# Patient Record
Sex: FEMALE | Race: BLACK OR AFRICAN AMERICAN | URBAN - METROPOLITAN AREA
[De-identification: names, ages, dates, MRNs, and addresses within clinical notes are randomized per-mention and may not be internally consistent; named-entity substitution may affect disease eponyms.]

---

## 2020-03-16 ENCOUNTER — HOSPITAL ENCOUNTER (OUTPATIENT)
Dept: PHYSICAL THERAPY | Age: 25
Discharge: HOME | End: 2020-03-16
Payer: COMMERCIAL

## 2020-03-16 PROCEDURE — 97535 SELF CARE MNGMENT TRAINING: CPT

## 2020-03-16 PROCEDURE — 97161 PT EVAL LOW COMPLEX 20 MIN: CPT

## 2020-03-16 PROCEDURE — 97110 THERAPEUTIC EXERCISES: CPT

## 2020-03-16 NOTE — PROGRESS NOTES
PT DAILY TREATMENT NOTE  10-18    Patient Name: Bib Liu  Date:3/16/2020  : 1995  [x]  Patient  Verified  Payor: Cheri Rounds / Plan: [facility] CHOICE PLUS / Product Type: PPO /    In time:3:13  Out time:4:00  Total Treatment Time (min): 52  Visit #: 1 of 4    Treatment Area: Displaced fracture of head of left radius, subsequent encounter for closed fracture with routine healing [X29.357T]    SUBJECTIVE  Pain Level (0-10 scale): 3  Any medication changes, allergies to medications, adverse drug reactions, diagnosis change, or new procedure performed?: [x] No    [] Yes (see summary sheet for update)  Subjective functional status/changes:   [] No changes reported  See POC    OBJECTIVE    23 min [x]Eval                  []Re-Eval     12 min Therapeutic Exercise:  [] See flow sheet : HEP instruction and demonstration   Rationale: increase ROM and increase strength to improve the patients ability to tolerate ADLs    12 min Self Care/Home Management: []  See flow sheet : pt education regarding anatomy and physiology of the UEs and how it relates to the pt's condition, pt education on using ice for 15-20 mins as needed to decrease pain/symptoms, pt education on avoiding activities that increase her pain/symptoms, pt education on talking to her MD if she is worried about her work duties because of her left elbow restrictions when she returns to work    Rationale: increase ROM, increase strength and decrease pain/symptoms  to improve the patients ability to tolerate functional tasks. With   [x] TE   [] TA   [] neuro   [x] Other: Self Care/Home management Patient Education: [x] Review HEP    [] Progressed/Changed HEP based on:   [] positioning   [] body mechanics   [] transfers   [] heat/ice application    [] other:      Other Objective/Functional Measures: See evaluation. Pain Level (0-10 scale) post treatment: 0    ASSESSMENT/Changes in Function: Pt given HEP handout to perform.  Pt understood exercises in HEP handout. Pt demonstrated decreased AROM, decreased strength, muscle tightness, and impaired mobility. Left elbow EXT lacking 37 degs AROM, lacking 18 degs PROM. Decreased  strength is also noted on the left hand when compared to the right. Pt would benefit from physical therapy to improve the above impairments to help the pt return to performing ADLs, functional and work activities. Patient will continue to benefit from skilled PT services to modify and progress therapeutic interventions, address functional mobility deficits, address ROM deficits, address strength deficits, analyze and address soft tissue restrictions, analyze and cue movement patterns, analyze and modify body mechanics/ergonomics, assess and modify postural abnormalities and instruct in home and community integration to attain remaining goals. [x]  See Plan of Care  []  See progress note/recertification  []  See Discharge Summary         Progress towards goals / Updated goals:  Short Term Goals: To be accomplished in 1 weeks:  1. Pt will report compliance and independence to [facility] to help the pt manage their pain and symptoms. Eval: established   Long Term Goals: To be accomplished in 4 weeks:  1. Pt will increase FOTO score to 75 points to improve ability to perform ADLs. Eval: 48 points  2. Pt will increase improve left  strength to an average of 35 lbs (out of 3 trials) to improve ability to tolerate gripping items in the left UE. Eval: 27 lbs (average of 3 trials)  3. Pt will increase AROM left elbow EXT to 0 degs, flex to WNL to improve ease of work tasks with when she returns to work. Eval: AROM EXT lacking 37 degs, flex 136 degs  4. Pt will report being able to lift a gallon of milk with the left UE with minimal to no increased pain/difficulty to improve ease of household tasks.    Eval: reports she is unable to lift a gallon of milk with the left UE    PLAN  [x]  Upgrade activities as tolerated     [x] Continue plan of care  [x]  Update interventions per flow sheet       []  Discharge due to:_  []  Other:_      Lashay West, PT 3/16/2020  4:15 PM    No future appointments.

## 2020-03-16 NOTE — PROGRESS NOTES
[facility] [address]  [place], [address]  [phone] fax    Plan of Care/ Statement of Necessity for Physical Therapy Services  Patient name: Silverio Sullivan Start of Care: 3/16/2020   Referral source: Efra Wakefield MD : 1995    Medical Diagnosis: Displaced fracture of head of left radius, subsequent encounter for closed fracture with routine healing [S52.122D]  Payor: Hayes De La Torre / Plan: [employer] / Product Type: PPO /  Onset Date: 2020    Treatment Diagnosis: left elbow pain   Prior Hospitalization: see medical history Provider#: [id number]   Medications: Verified on Patient summary List    Comorbidities: none   Prior Level of Function: Independent with ADLs, functional, and work activities with no limitations. The Plan of Care and following information is based on the information from the initial evaluation. Assessment/ key information:   Pt is a 22year old female who presents to therapy today with left elbow pain s/p radial head fracture. Pt states that she fell on 2020 when she landed on her hand and left elbow. Pt reports going to urgent care and had the elbow in a cast and sling. Pt states that the MD took the sling/cast off and has a work restriction of lifting no more than 10 lbs (states she returns to work next week). Pt reports having difficulty with extending her left elbow and picking up items. Pt demonstrated decreased AROM, decreased strength, muscle tightness, and impaired mobility. Left elbow EXT lacking 37 degs AROM, lacking 18 degs PROM. Decreased  strength is also noted on the left hand when compared to the right. Limited left wrist supination AROM/PROM noted as well. Pt would benefit from physical therapy to improve the above impairments to help the pt return to performing ADLs, functional and work activities.      Evaluation Complexity History LOW Complexity : Zero comorbidities / personal factors that will impact the outcome / POC; Examination MEDIUM Complexity : 3 Standardized tests and measures addressing body structure, function, activity limitation and / or participation in recreation  ;Presentation LOW Complexity : Stable, uncomplicated  ;Clinical Decision Making MEDIUM Complexity : FOTO score of 26-74  Overall Complexity Rating: LOW   Problem List: pain affecting function, decrease ROM, decrease strength, edema affecting function, decrease ADL/ functional abilitiies, decrease activity tolerance, decrease flexibility/ joint mobility and decrease transfer abilities   Treatment Plan may include any combination of the following: Therapeutic exercise, Therapeutic activities, Neuromuscular re-education, Physical agent/modality, Manual therapy, Patient education, Self Care training, Functional mobility training and Home safety training  Patient / Family readiness to learn indicated by: asking questions, trying to perform skills and interest  Persons(s) to be included in education: patient (P)  Barriers to Learning/Limitations: None  Patient Goal (s): stretching the arm fully out and no stiffness  Patient Self Reported Health Status: good  Rehabilitation Potential: good    Short Term Goals: To be accomplished in 1 weeks:  1. Pt will report compliance and independence to [facility] to help the pt manage their pain and symptoms. Eval: established   Long Term Goals: To be accomplished in 4 weeks:  1. Pt will increase FOTO score to 75 points to improve ability to perform ADLs. Eval: 48 points  2. Pt will increase improve left  strength to an average of 35 lbs (out of 3 trials) to improve ability to tolerate gripping items in the left UE. Eval: 27 lbs (average of 3 trials)  3. Pt will increase AROM left elbow EXT to 0 degs, flex to WNL to improve ease of work tasks with when she returns to work. Eval: AROM EXT lacking 37 degs, flex 136 degs  4.  Pt will report being able to lift a gallon of milk with the left UE with minimal to no increased pain/difficulty to improve ease of household tasks. Eval: reports she is unable to lift a gallon of milk with the left UE    Frequency / Duration: Patient to be seen 1 times per week for 4 weeks. Patient/ Caregiver education and instruction: Diagnosis, prognosis, self care, activity modification and exercises   [x]  Plan of care has been reviewed with MONIQUE Ndiaye, PT 3/16/2020 4:10 PM  _____________________________________________________________________  I certify that the above Therapy Services are being furnished while the patient is under my care. I agree with the treatment plan and certify that this therapy is necessary.     Physician's Signature:____________________  Date:__________Time:______    Please sign and return to [facility] [address]  [address]  [place], [address]  [phone] fax

## 2020-03-23 ENCOUNTER — HOSPITAL ENCOUNTER (OUTPATIENT)
Dept: PHYSICAL THERAPY | Age: 25
Discharge: HOME | End: 2020-03-23
Payer: COMMERCIAL

## 2020-03-23 PROCEDURE — 97140 MANUAL THERAPY 1/> REGIONS: CPT

## 2020-03-23 PROCEDURE — 97110 THERAPEUTIC EXERCISES: CPT

## 2020-03-23 NOTE — PROGRESS NOTES
PT DAILY TREATMENT NOTE  10-18    Patient Name: Julio C Aldana  Date:3/23/2020  : 1995  [x]  Patient  Verified  Payor: Jamil Sung / [facility] [employer] / Product Type: PPO /    In time: 12:01   Out time: 1:01  Total Treatment Time (min): 60  Visit #: 2 of 4    Treatment Area: Displaced fracture of head of left radius, subsequent encounter for closed fracture with routine healing [V90.977Z]    SUBJECTIVE  Pain Level (0-10 scale): 0  Any medication changes, allergies to medications, adverse drug reactions, diagnosis change, or new procedure performed?: [x] No    [] Yes (see summary sheet for update)  Subjective functional status/changes:   [] No changes reported  Pt reports compliance with HEP and states she feels her left elbow is straighter. Pt reports she is returning to work Wednesday.      OBJECTIVE    Modality rationale: decrease inflammation and decrease pain to improve the patients ability to tolerate ADLs   Min Type Additional Details    [] Estim:  []Unatt       []IFC  []Premod                        []Other:  []w/ice   []w/heat  Position:  Location:    [] Estim: []Att    []TENS instruct  []NMES                    []Other:  []w/US   []w/ice   []w/heat  Position:  Location:    []  Traction: [] Cervical       []Lumbar                       [] Prone          []Supine                       []Intermittent   []Continuous Lbs:  [] before manual  [] after manual    []  Ultrasound: []Continuous   [] Pulsed                           []1MHz   []3MHz Location:  W/cm2:    []  Iontophoresis with dexamethasone         Location: [] Take home patch   [] In clinic   10 [x]  Ice     []  heat  []  Ice massage  []  Laser   []  Anodyne Position: supine with elbow EXT  Location: left elbow/forearm    []  Laser with stim  []  Other: Position:  Location:    []  Vasopneumatic Device Pressure:       [] lo [] med [] hi   Temperature: [] lo [] med [] hi   [] Skin assessment post-treatment:  []intact []redness- no adverse reaction    []redness  adverse reaction:     42 min Therapeutic Exercise:  [x] See flow sheet : exercises, HEP instruction (see below), ROM measurements   Rationale: increase ROM and increase strength to improve the patients ability to tolerate ADLs    8 min Manual Therapy: in sitting: DTM to left wrist extensors   Rationale: decrease pain, increase ROM and increase tissue extensibility to improve activity tolerance. With   [x] TE   [] TA   [] neuro   [] Other:  Patient Education: [x] Review HEP    [x] Progressed/Changed HEP based on:   [] positioning   [x] body mechanics   [] transfers   [] heat/ice application    [] other:      Other Objective/Functional Measures: Initiated exercises/interventions in flow sheet. Left elbow EXT AROM: Lacking 7 degs. Continues to demonstrate limitations with left wrist supination AROM. Pain Level (0-10 scale) post treatment: 0    ASSESSMENT/Changes in Function: Reported no pain post session today. Pt demonstrates significant improvements in left elbow AROM since the evaluation last week. She continues to demonstrate limitations with left wrist supination AROM and decreased overall  strength. Educated pt on continuing HEP at home and adding exercises she performed in the clinic today to her HEP to further improve ROM and strength. Continue POC as tolerated. Patient will continue to benefit from skilled PT services to modify and progress therapeutic interventions, address functional mobility deficits, address ROM deficits, address strength deficits, analyze and address soft tissue restrictions, analyze and cue movement patterns, analyze and modify body mechanics/ergonomics, assess and modify postural abnormalities and instruct in home and community integration to attain remaining goals.      []  See Plan of Care  []  See progress note/recertification  []  See Discharge Summary         Progress towards goals / Updated goals:  Short Term Goals: To be accomplished in 1 weeks:  1. Pt will report compliance and independence to HEP to help the pt manage their pain and symptoms. Eval: established   Current: MET, reports compliance 3/23/2020  Long Term Goals: To be accomplished in 4 weeks:  1. Pt will increase FOTO score to 75 points to improve ability to perform ADLs. Eval: 48 points  2. Pt will increase improve left  strength to an average of 35 lbs (out of 3 trials) to improve ability to tolerate gripping items in the left UE. Eval: 27 lbs (average of 3 trials)  3. Pt will increase AROM left elbow EXT to 0 degs, flex to WNL to improve ease of work tasks with when she returns to work. Eval: AROM EXT lacking 37 degs, flex 136 degs  4. Pt will report being able to lift a gallon of milk with the left UE with minimal to no increased pain/difficulty to improve ease of household tasks.    Eval: reports she is unable to lift a gallon of milk with the left UE    PLAN  [x]  Upgrade activities as tolerated     [x]  Continue plan of care  [x]  Update interventions per flow sheet       []  Discharge due to:_  []  Other:_      Estefani Mack, PT 3/23/2020  12:08 PM    Future Appointments   Date Time Provider Shelly Frederick   3/30/2020  2:00 PM Agusto Berman, PT [facility]   4/6/2020 12:00 PM John Escamilla, PT [facility]

## 2020-03-30 ENCOUNTER — APPOINTMENT (OUTPATIENT)
Dept: PHYSICAL THERAPY | Age: 25
End: 2020-03-30
Payer: COMMERCIAL

## 2020-04-06 ENCOUNTER — APPOINTMENT (OUTPATIENT)
Dept: PHYSICAL THERAPY | Age: 25
End: 2020-04-06

## 2020-04-10 NOTE — PROGRESS NOTES
[facility]  [address]  [place], [address]  [phone] fax    Physical Therapy Discharge Summary  Patient name: Francis Tubbs Start of Care: 3/16/2020   Referral source: Clover Ramsay MD : 1995               Medical Diagnosis: Displaced fracture of head of left radius, subsequent encounter for closed fracture with routine healing [S52.122D]  Payor: Judi Moreno / Plan: [employer] / Product Type: PPO /  Onset Date: 2020               Treatment Diagnosis: left elbow pain   Prior Hospitalization: see medical history Provider#: [id number]   Medications: Verified on Patient summary List    Comorbidities: none   Prior Level of Function: Independent with ADLs, functional, and work activities with no limitations. Visits from Start of Care: 2    Missed Visits: 0  Reporting Period : 3/16/2020 to 3/23/2020    Summary of Care:  Goal: Pt will report compliance and independence to [facility] to help the pt manage their pain and symptoms. Status at last note/certification: unable to reassess   Status at discharge: not met    Goal: Pt will increase FOTO score to 75 points to improve ability to perform ADLs. Status at last note/certification: unable to reassess   Status at discharge: not met    Goal: Pt will increase improve left  strength to an average of 35 lbs (out of 3 trials) to improve ability to tolerate gripping items in the left UE. Status at last note/certification: unable to reassess   Status at discharge: not met    Goal: Pt will increase AROM left elbow EXT to 0 degs, flex to WNL to improve ease of work tasks with when she returns to work. Status at last note/certification: unable to reassess   Status at discharge: not met    Goal: Pt will report being able to lift a gallon of milk with the left UE with minimal to no increased pain/difficulty to improve ease of household tasks.    Status at last note/certification: unable to reassess   Status at discharge: not met    ASSESSMENT/RECOMMENDATIONS:  Pt was seen for 2 therapy sessions. Per covid-19 follow up telephone call, the pt agrees to d/c from therapy at this time. Pt is therefore d/c'ed and unable to reassess goals at this time.    [x]Discontinue therapy: []Patient has reached or is progressing toward set goals      [x]Patient is non-compliant or has abdicated; covid-19      []Due to lack of appreciable progress towards set goals    Jeff Mccoy, PT 4/10/2020 9:12 AM

## 2024-12-30 ENCOUNTER — HOSPITAL ENCOUNTER (OUTPATIENT)
Facility: HOSPITAL | Age: 29
Discharge: HOME | End: 2025-01-02
Payer: COMMERCIAL

## 2024-12-30 VITALS — HEIGHT: 63 IN | BODY MASS INDEX: 35.44 KG/M2 | WEIGHT: 200 LBS

## 2024-12-30 DIAGNOSIS — Z01.818 PRE-OP TESTING: Primary | ICD-10-CM

## 2024-12-30 LAB
BASOPHILS # BLD: 0 K/UL (ref 0–0.1)
BASOPHILS NFR BLD: 1 % (ref 0–2)
DIFFERENTIAL METHOD BLD: ABNORMAL
EOSINOPHIL # BLD: 0.1 K/UL (ref 0–0.4)
EOSINOPHIL NFR BLD: 2 % (ref 0–5)
ERYTHROCYTE [DISTWIDTH] IN BLOOD BY AUTOMATED COUNT: 19 % (ref 11.6–14.5)
HCT VFR BLD AUTO: 35.1 % (ref 35–45)
HGB BLD-MCNC: 10.6 G/DL (ref 12–16)
IMM GRANULOCYTES # BLD AUTO: 0 K/UL (ref 0–0.04)
IMM GRANULOCYTES NFR BLD AUTO: 0 % (ref 0–0.5)
LYMPHOCYTES # BLD: 2.6 K/UL (ref 0.9–3.6)
LYMPHOCYTES NFR BLD: 43 % (ref 21–52)
MCH RBC QN AUTO: 21.9 PG (ref 24–34)
MCHC RBC AUTO-ENTMCNC: 30.2 G/DL (ref 31–37)
MCV RBC AUTO: 72.7 FL (ref 78–100)
MONOCYTES # BLD: 0.5 K/UL (ref 0.05–1.2)
MONOCYTES NFR BLD: 8 % (ref 3–10)
NEUTS SEG # BLD: 2.8 K/UL (ref 1.8–8)
NEUTS SEG NFR BLD: 47 % (ref 40–73)
NRBC # BLD: 0 K/UL (ref 0–0.01)
NRBC BLD-RTO: 0 PER 100 WBC
PLATELET # BLD AUTO: 433 K/UL (ref 135–420)
PMV BLD AUTO: 10.6 FL (ref 9.2–11.8)
RBC # BLD AUTO: 4.83 M/UL (ref 4.2–5.3)
WBC # BLD AUTO: 6 K/UL (ref 4.6–13.2)

## 2024-12-30 PROCEDURE — 85025 COMPLETE CBC W/AUTO DIFF WBC: CPT

## 2025-01-06 RX ORDER — FERROUS SULFATE 325(65) MG
325 TABLET ORAL
COMMUNITY

## 2025-01-06 NOTE — PERIOP NOTE
PCP is not aware of the surgery. Instructed to bring Insurance card and ID on the day of surgery.  Advance directives status: none       SURGICAL PRE-ADMISSION INSTRUCTIONS    Your Medication instructions:    Take or Use these medications the morning of surgery with a sip of water: none    Do Not Take morning of surgery: iron    Follow surgeon's instructions for stopping NSAIDs (i.e. Ibuprofen/Motrin, Advil, BC powder, Aleve/Naproxen, Voltaren cream or tablet, Mobic/Meloxicam, Excedrin, Celebrex, Ketorolac/Toradol, etc). If no instructions provided stop NSAIDs 7 days prior to surgery.    Stop supplements and vitamins (except multivitamins-stop the night prior to surgery) 2 weeks prior to surgery.    Patient denies being on any inhalers, eye drops, ointment/lotions/gels, patches, injectable medications, phentermine, supplements, vitamins, and herbals at this time. Instructed patient to please notify Preanesthesia Testing at [phone] with any medication changes after the PAT phone appointment.    General Day Surgery Instructions    Do NOT eat or drink anything, including water, ice chips, soda, candy, or gum after MIDNIGHT on the night before surgery, unless you have specific instructions from your Surgeon or Anesthesia Provider to do so.   Please follow instructions for cleaning skin with CHG wash kit 3 days prior to surgery as directed.    Please shower with antibacterial bar soap prior to arrival on day of surgery.  No smoking/vaping/chewing tobacco products 24 hours before surgery.  No recreational drugs for one week prior to the day of surgery.  Remove all jewelry, nail polish, makeup (including mascara); no lotions, powders, deodorant, or perfume/cologne/after shave.  Leave all valuables, including money/purse/wallet, jewelry, laptop, etc.. at home.  Bring durable medical equipment (DME) needed: none  Glasses/Contact lenses, Hearing aids, and Dentures may be worn to the hospital.  They will be removed prior

## 2025-01-10 ENCOUNTER — ANESTHESIA EVENT (OUTPATIENT)
Facility: HOSPITAL | Age: 30
End: 2025-01-10
Payer: COMMERCIAL

## 2025-01-10 ENCOUNTER — HOSPITAL ENCOUNTER (OUTPATIENT)
Facility: HOSPITAL | Age: 30
Setting detail: OUTPATIENT SURGERY
Discharge: HOME | End: 2025-01-10
Attending: SURGERY | Admitting: SURGERY
Payer: COMMERCIAL

## 2025-01-10 ENCOUNTER — ANESTHESIA (OUTPATIENT)
Facility: HOSPITAL | Age: 30
End: 2025-01-10
Payer: COMMERCIAL

## 2025-01-10 ENCOUNTER — APPOINTMENT (OUTPATIENT)
Facility: HOSPITAL | Age: 30
End: 2025-01-10
Attending: SURGERY
Payer: COMMERCIAL

## 2025-01-10 VITALS
RESPIRATION RATE: 15 BRPM | SYSTOLIC BLOOD PRESSURE: 113 MMHG | BODY MASS INDEX: 35.68 KG/M2 | HEART RATE: 75 BPM | TEMPERATURE: 97.9 F | WEIGHT: 201.4 LBS | HEIGHT: 63 IN | OXYGEN SATURATION: 100 % | DIASTOLIC BLOOD PRESSURE: 67 MMHG

## 2025-01-10 DIAGNOSIS — Z30.46 NEXPLANON REMOVAL: Primary | ICD-10-CM

## 2025-01-10 LAB — HCG UR QL: NEGATIVE

## 2025-01-10 PROCEDURE — 3700000001 HC ADD 15 MINUTES (ANESTHESIA): Performed by: SURGERY

## 2025-01-10 PROCEDURE — 7100000010 HC PHASE II RECOVERY - FIRST 15 MIN: Performed by: SURGERY

## 2025-01-10 PROCEDURE — 7100000011 HC PHASE II RECOVERY - ADDTL 15 MIN: Performed by: SURGERY

## 2025-01-10 PROCEDURE — 2500000003 HC RX 250 WO HCPCS: Performed by: SURGERY

## 2025-01-10 PROCEDURE — 3700000000 HC ANESTHESIA ATTENDED CARE: Performed by: SURGERY

## 2025-01-10 PROCEDURE — 6360000002 HC RX W HCPCS: Performed by: NURSE ANESTHETIST, CERTIFIED REGISTERED

## 2025-01-10 PROCEDURE — 2580000003 HC RX 258: Performed by: SURGERY

## 2025-01-10 PROCEDURE — 81025 URINE PREGNANCY TEST: CPT

## 2025-01-10 PROCEDURE — 6360000002 HC RX W HCPCS: Performed by: SURGERY

## 2025-01-10 PROCEDURE — 2709999900 HC NON-CHARGEABLE SUPPLY: Performed by: SURGERY

## 2025-01-10 PROCEDURE — 3600000002 HC SURGERY LEVEL 2 BASE: Performed by: SURGERY

## 2025-01-10 PROCEDURE — 3600000012 HC SURGERY LEVEL 2 ADDTL 15MIN: Performed by: SURGERY

## 2025-01-10 RX ORDER — LIDOCAINE HYDROCHLORIDE 20 MG/ML
INJECTION, SOLUTION EPIDURAL; INFILTRATION; INTRACAUDAL; PERINEURAL
Status: DISCONTINUED | OUTPATIENT
Start: 2025-01-10 | End: 2025-01-10 | Stop reason: SDUPTHER

## 2025-01-10 RX ORDER — MIDAZOLAM HYDROCHLORIDE 1 MG/ML
INJECTION, SOLUTION INTRAMUSCULAR; INTRAVENOUS
Status: DISCONTINUED | OUTPATIENT
Start: 2025-01-10 | End: 2025-01-10 | Stop reason: SDUPTHER

## 2025-01-10 RX ORDER — ONDANSETRON 2 MG/ML
4 INJECTION INTRAMUSCULAR; INTRAVENOUS
Status: CANCELLED | OUTPATIENT
Start: 2025-01-10 | End: 2025-01-11

## 2025-01-10 RX ORDER — PROPOFOL 10 MG/ML
INJECTION, EMULSION INTRAVENOUS
Status: DISCONTINUED | OUTPATIENT
Start: 2025-01-10 | End: 2025-01-10 | Stop reason: SDUPTHER

## 2025-01-10 RX ORDER — SODIUM CHLORIDE, SODIUM LACTATE, POTASSIUM CHLORIDE, CALCIUM CHLORIDE 600; 310; 30; 20 MG/100ML; MG/100ML; MG/100ML; MG/100ML
INJECTION, SOLUTION INTRAVENOUS CONTINUOUS
Status: CANCELLED | OUTPATIENT
Start: 2025-01-10

## 2025-01-10 RX ORDER — ACETAMINOPHEN 325 MG/1
650 TABLET ORAL
Status: CANCELLED | OUTPATIENT
Start: 2025-01-10 | End: 2025-01-11

## 2025-01-10 RX ORDER — SODIUM CHLORIDE 0.9 % (FLUSH) 0.9 %
5-40 SYRINGE (ML) INJECTION EVERY 12 HOURS SCHEDULED
Status: CANCELLED | OUTPATIENT
Start: 2025-01-10

## 2025-01-10 RX ORDER — LABETALOL HYDROCHLORIDE 5 MG/ML
5 INJECTION, SOLUTION INTRAVENOUS
Status: CANCELLED | OUTPATIENT
Start: 2025-01-10

## 2025-01-10 RX ORDER — HYDROMORPHONE HYDROCHLORIDE 1 MG/ML
0.5 INJECTION, SOLUTION INTRAMUSCULAR; INTRAVENOUS; SUBCUTANEOUS EVERY 5 MIN PRN
Status: CANCELLED | OUTPATIENT
Start: 2025-01-10

## 2025-01-10 RX ORDER — NALOXONE HYDROCHLORIDE 0.4 MG/ML
INJECTION, SOLUTION INTRAMUSCULAR; INTRAVENOUS; SUBCUTANEOUS PRN
Status: CANCELLED | OUTPATIENT
Start: 2025-01-10

## 2025-01-10 RX ORDER — OXYCODONE AND ACETAMINOPHEN 5; 325 MG/1; MG/1
1 TABLET ORAL EVERY 4 HOURS PRN
Qty: 4 TABLET | Refills: 0 | Status: SHIPPED | OUTPATIENT
Start: 2025-01-10 | End: 2025-01-11

## 2025-01-10 RX ORDER — DIPHENHYDRAMINE HYDROCHLORIDE 50 MG/ML
12.5 INJECTION INTRAMUSCULAR; INTRAVENOUS
Status: CANCELLED | OUTPATIENT
Start: 2025-01-10 | End: 2025-01-11

## 2025-01-10 RX ORDER — SODIUM CHLORIDE 0.9 % (FLUSH) 0.9 %
5-40 SYRINGE (ML) INJECTION PRN
Status: CANCELLED | OUTPATIENT
Start: 2025-01-10

## 2025-01-10 RX ORDER — MIDAZOLAM HYDROCHLORIDE 2 MG/2ML
2 INJECTION, SOLUTION INTRAMUSCULAR; INTRAVENOUS
Status: CANCELLED | OUTPATIENT
Start: 2025-01-10 | End: 2025-01-11

## 2025-01-10 RX ORDER — FENTANYL CITRATE 50 UG/ML
25 INJECTION, SOLUTION INTRAMUSCULAR; INTRAVENOUS EVERY 5 MIN PRN
Status: CANCELLED | OUTPATIENT
Start: 2025-01-10

## 2025-01-10 RX ORDER — SODIUM CHLORIDE 9 MG/ML
INJECTION, SOLUTION INTRAVENOUS PRN
Status: CANCELLED | OUTPATIENT
Start: 2025-01-10

## 2025-01-10 RX ORDER — SODIUM CHLORIDE 9 MG/ML
INJECTION, SOLUTION INTRAVENOUS CONTINUOUS
Status: DISCONTINUED | OUTPATIENT
Start: 2025-01-10 | End: 2025-01-10 | Stop reason: HOSPADM

## 2025-01-10 RX ORDER — OXYCODONE HYDROCHLORIDE 5 MG/1
5 TABLET ORAL
Status: CANCELLED | OUTPATIENT
Start: 2025-01-10 | End: 2025-01-11

## 2025-01-10 RX ADMIN — MIDAZOLAM 2 MG: 1 INJECTION INTRAMUSCULAR; INTRAVENOUS at 08:28

## 2025-01-10 RX ADMIN — SODIUM CHLORIDE: 9 INJECTION, SOLUTION INTRAVENOUS at 07:01

## 2025-01-10 RX ADMIN — PROPOFOL 50 MG: 10 INJECTION, EMULSION INTRAVENOUS at 08:32

## 2025-01-10 RX ADMIN — WATER 2000 MG: 1 INJECTION INTRAMUSCULAR; INTRAVENOUS; SUBCUTANEOUS at 08:32

## 2025-01-10 RX ADMIN — PROPOFOL 50 MG: 10 INJECTION, EMULSION INTRAVENOUS at 08:39

## 2025-01-10 RX ADMIN — PROPOFOL 20 MG: 10 INJECTION, EMULSION INTRAVENOUS at 08:47

## 2025-01-10 RX ADMIN — WATER 2 MG: 1 INJECTION INTRAMUSCULAR; INTRAVENOUS; SUBCUTANEOUS at 08:28

## 2025-01-10 RX ADMIN — LIDOCAINE HYDROCHLORIDE 40 MG: 20 INJECTION, SOLUTION EPIDURAL; INFILTRATION; INTRACAUDAL; PERINEURAL at 08:30

## 2025-01-10 ASSESSMENT — PAIN - FUNCTIONAL ASSESSMENT: PAIN_FUNCTIONAL_ASSESSMENT: 0-10

## 2025-01-10 NOTE — PERIOP NOTE
TRANSFER - IN REPORT:    Verbal report received from ELIZABETH Vega on Halina Monson  being received from OR for routine progression of patient care      Report consisted of patient's Situation, Background, Assessment and   Recommendations(SBAR).     Information from the following report(s) Nurse Handoff Report, Intake/Output, and MAR was reviewed with the receiving nurse.    Opportunity for questions and clarification was provided.      Assessment completed upon patient's arrival to unit and care assumed.

## 2025-01-10 NOTE — ANESTHESIA POSTPROCEDURE EVALUATION
Post-Anesthesia Evaluation & Assessment    Visit Vitals  /67   Pulse 75   Temp 97.9 °F (36.6 °C) (Temporal)   Resp 15   Ht 1.588 m (5' 2.52\")   Wt 91.4 kg (201 lb 6.4 oz)   SpO2 100%   BMI 36.23 kg/m²       Nausea/Vomiting: no nausea    Post-operative hydration adequate.    Pain score (VAS): 0    Mental status & Level of consciousness: alert and oriented x 3    Neurological status: moves all extremities, sensation grossly intact    Pulmonary status: airway patent, no supplemental oxygen required    Complications related to anesthesia: none    Additional comments:

## 2025-01-10 NOTE — DISCHARGE INSTRUCTIONS
Instructions  Overview  After surgery, it is common to have some minor bruising or bleeding from the cut (incision) made by your doctor. But problems may occur that cause you to bleed too much in the surgery area.  An injury to a blood vessel can cause bleeding after surgery. Other causes include medicines such as aspirin or anticoagulants (blood thinners).  To help stop the bleeding, your doctor may have put pressure on the incision or sewn up or cauterized (sealed) the incision. Or you may have had surgery to stop bleeding inside the surgery area. Your doctor also may have given you medicines that help stop the bleeding.  How can you care for yourself at home?  If you have strips of tape on the incision, leave the tape on until it falls off. Or follow your doctor's instructions for removing the tape. Keep the area dry at all times.  You will have a dressing over the incision. A dressing helps the incision heal and protects it. Your doctor will tell you how to take care of this.  If you do not have tape on the incision, wash the area daily with warm, soapy water, and pat it dry. Don't use hydrogen peroxide or alcohol, which can slow healing.    When should you call for help?    Call your doctor now or seek immediate medical care if:    You are dizzy or lightheaded, or you feel like you may faint.     You have bleeding that starts again or gets worse, such as soaking one or more bandages over 2 to 4 hours, even after holding pressure on the area.         __________________________________________________________________________________________________________________________________

## 2025-01-10 NOTE — INTERVAL H&P NOTE
Update History & Physical    The patient's History and Physical of January 10, 2024 was reviewed with the patient and I examined the patient. There was no change. The surgical site was confirmed by the patient and me.     Plan: The risks, benefits, expected outcome, and alternative to the recommended procedure have been discussed with the patient. Patient understands and wants to proceed with the procedure.     Electronically signed by TYSON HOLDER MD on 1/10/2025 at 8:09 AM

## 2025-01-10 NOTE — PERIOP NOTE
Reviewed PTA medication list with patient/caregiver and patient/caregiver denies any additional medications.     Patient admits to having a responsible adult care for them at home for at least 24 hours after surgery.    Patient encouraged to use gown warming system and informed that using said warming gown to regulate body temperature prior to a procedure has been shown to help reduce the risks of blood clots and infection.    Patient's pharmacy of choice verified and documented in PTA medication section.    Dual skin assessment & fall risk band verification completed with Aria MALDONADO RN.

## 2025-01-10 NOTE — OP NOTE
[address]  [address]                            OPERATIVE REPORT      PATIENT NAME: JUAN KAYE                  : 1995  MED REC NO: [id number]                       ROOM: OR  ACCOUNT NO: [id number]                       ADMIT DATE: 01/10/2025  PROVIDER: Leo Cope MD    DATE OF SERVICE:  01/10/2025    PREOPERATIVE DIAGNOSES:  Left upper extremity foreign body (retained Nexplanon contraceptive device).    POSTOPERATIVE DIAGNOSES:  Left upper extremity foreign body (retained Nexplanon contraceptive device).    PROCEDURES PERFORMED:  Removal of left upper extremity foreign body with C-arm and ultrasound guidance.    SURGEON:  Leo Cope MD    ASSISTANT:  None.    ANESTHESIA:  MAC/sedation with local anesthesia.    ESTIMATED BLOOD LOSS:  Minimal.    SPECIMENS REMOVED:  Nexplanon device (not sent for permanent pathology or culture).    INTRAOPERATIVE FINDINGS:  ***     COMPLICATIONS:  None.    IMPLANTS:  None.    INDICATIONS:  ***    DESCRIPTION OF PROCEDURE:  The patient is a 29-year-old black female referred to me from GYN for Nexplanon device in her left upper extremity that they were unable to remove under local anesthesia alone.  She wished to proceed with removal.  I discussed with her the nature of surgery, alternatives, benefits, and risks.  She gave consent to proceed.  She was taken to the OR on January 10, 2025, met and identified in the preop holding area.  The surgical site was marked.  She was then brought back to the OR.  Preoperative intravenous antibiotics were given per protocol.  She was positioned on table and all pressure points appropriately padded.  She was sedated through the IV with supplemental oxygen and monitors placed.  The area was prepped and draped in usual sterile fashion.  After surgical pause, I began the case by first locating the lesion.  I did a limited ultrasound of the area to again

## 2025-01-10 NOTE — BRIEF OP NOTE
Brief Postoperative Note      Patient: Halina Monson  YOB: 1995  MRN: [id number]    Date of Procedure: 1/10/2025    Pre-Op Diagnosis Codes:      * Superficial foreign body of right upper arm with infection, initial encounter [S40.851A, L08.9]     * Presence of contraceptive device [Z97.5]     * Displacement of device, unspecified device type, initial encounter [T85.628A]     * Superficial foreign body of left upper arm with infection, initial encounter [S40.852A, L08.9]     * Residual foreign body in soft tissue [M79.5]    Post-Op Diagnosis: Same       Procedure(s):  REMOVAL OF LEFT UPPER EXTREMITY FOREIGN BODY WITH C-ARM    Surgeon(s):  Tyson Holder MD    Assistant:  Surgical Assistant: Anders Carney    Anesthesia: Monitor Anesthesia Care    Estimated Blood Loss (mL): Minimal    Complications: None    Specimens:   * No specimens in log *    Implants:  * No implants in log *      Drains: * No LDAs found *    Findings:  Infection Present At Time Of Surgery (PATOS) (choose all levels that have infection present):  No infection present  Other Findings:    This procedure was not performed to treat primary cutaneous melanoma through wide local excision    Electronically signed by TYSON HOLDER MD on 1/10/2025 at 8:33 AM    Op note dictated #[id number]  RP

## 2025-01-10 NOTE — ANESTHESIA PRE PROCEDURE
from Last 3 Encounters:   01/10/25 127/63       NPO Status: Time of last liquid consumption: 2300                        Time of last solid consumption: 2015                        Date of last liquid consumption: 01/09/25                        Date of last solid food consumption: 01/09/25    BMI:   Wt Readings from Last 3 Encounters:   01/10/25 91.4 kg (201 lb 6.4 oz)   12/30/24 90.7 kg (200 lb)     Body mass index is 36.23 kg/m².    CBC:   Lab Results   Component Value Date/Time    WBC 6.0 12/30/2024 11:15 AM    RBC 4.83 12/30/2024 11:15 AM    HGB 10.6 12/30/2024 11:15 AM    HCT 35.1 12/30/2024 11:15 AM    MCV 72.7 12/30/2024 11:15 AM    RDW 19.0 12/30/2024 11:15 AM     12/30/2024 11:15 AM       CMP: No results found for: \"NA\", \"K\", \"CL\", \"CO2\", \"BUN\", \"CREATININE\", \"GFRAA\", \"AGRATIO\", \"LABGLOM\", \"GLUCOSE\", \"GLU\", \"CALCIUM\", \"BILITOT\", \"ALKPHOS\", \"AST\", \"ALT\"    POC Tests: No results for input(s): \"POCGLU\", \"POCNA\", \"POCK\", \"POCCL\", \"POCBUN\", \"POCHEMO\", \"POCHCT\" in the last 72 hours.    Coags: No results found for: \"PROTIME\", \"INR\", \"APTT\"    HCG (If Applicable):   Lab Results   Component Value Date    PREGTESTUR Negative 01/10/2025        ABGs: No results found for: \"PHART\", \"PO2ART\", \"JLD7JLR\", \"MOL7KNR\", \"BEART\", \"Z2UBHKFJ\"     Type & Screen (If Applicable):  No results found for: \"ABORH\", \"LABANTI\"    Drug/Infectious Status (If Applicable):  No results found for: \"HIV\", \"HEPCAB\"    COVID-19 Screening (If Applicable): No results found for: \"COVID19\"        Anesthesia Evaluation  Patient summary reviewed and Nursing notes reviewed  Airway: Mallampati: I  TM distance: >3 FB   Neck ROM: full  Mouth opening: > = 3 FB   Dental: normal exam         Pulmonary:Negative Pulmonary ROS breath sounds clear to auscultation                             Cardiovascular:Negative CV ROS            Rhythm: regular  Rate: normal                    Neuro/Psych:   Negative Neuro/Psych ROS              GI/Hepatic/Renal: Neg

## (undated) DEVICE — SUTURE MONOCRYL + SZ 4-0 L27IN ABSRB UD L19MM PS-2 3/8 CIR MCP426H

## (undated) DEVICE — SUTURE VICRYL + SZ 2-0 L27IN ABSRB UD CT-2 L26MM 1/2 CIR TAPR VCP269H

## (undated) DEVICE — GLOVE ORANGE PI 7 1/2   MSG9075

## (undated) DEVICE — GARMENT,MEDLINE,DVT,INT,CALF,MED, GEN2: Brand: MEDLINE

## (undated) DEVICE — BLADE,CARBON-STEEL,15,STRL,DISPOSABLE,TB: Brand: MEDLINE

## (undated) DEVICE — SUTURE PROL SZ 3-0 L18IN NONABSORBABLE BLU L19MM PC-5 3/8 8632G

## (undated) DEVICE — SOLUTION IRRIG 500ML 0.9% SOD CHLO USP POUR PLAS BTL

## (undated) DEVICE — SYRINGE MED 10ML LUERLOCK TIP W/O SFTY DISP

## (undated) DEVICE — LIQUIBAND RAPID ADHESIVE 36/CS 0.8ML: Brand: MEDLINE

## (undated) DEVICE — GLOVE SURG SZ 8 L12IN THK75MIL DK GRN LTX FREE

## (undated) DEVICE — Device

## (undated) DEVICE — SUTURE VICRYL + SZ 3-0 L27IN ABSRB UD L26MM SH 1/2 CIR VCP416H